# Patient Record
Sex: MALE | Race: BLACK OR AFRICAN AMERICAN | NOT HISPANIC OR LATINO | Employment: FULL TIME | URBAN - METROPOLITAN AREA
[De-identification: names, ages, dates, MRNs, and addresses within clinical notes are randomized per-mention and may not be internally consistent; named-entity substitution may affect disease eponyms.]

---

## 2022-08-27 ENCOUNTER — HOSPITAL ENCOUNTER (OUTPATIENT)
Dept: RADIOLOGY | Facility: HOSPITAL | Age: 62
Discharge: HOME | End: 2022-08-27
Payer: COMMERCIAL

## 2022-08-27 ENCOUNTER — HOSPITAL ENCOUNTER (OUTPATIENT)
Dept: MRI IMAGING | Facility: HOSPITAL | Age: 62
Discharge: HOME | End: 2022-08-27
Attending: PSYCHIATRY & NEUROLOGY
Payer: COMMERCIAL

## 2022-08-27 DIAGNOSIS — M54.17 LUMBOSACRAL RADICULOPATHY: ICD-10-CM

## 2022-08-27 PROCEDURE — 72148 MRI LUMBAR SPINE W/O DYE: CPT

## 2024-01-19 ENCOUNTER — HOSPITAL ENCOUNTER (EMERGENCY)
Facility: HOSPITAL | Age: 64
Discharge: HOME | End: 2024-01-19
Attending: EMERGENCY MEDICINE
Payer: COMMERCIAL

## 2024-01-19 VITALS
TEMPERATURE: 98.5 F | HEART RATE: 78 BPM | RESPIRATION RATE: 20 BRPM | SYSTOLIC BLOOD PRESSURE: 143 MMHG | OXYGEN SATURATION: 98 % | DIASTOLIC BLOOD PRESSURE: 92 MMHG

## 2024-01-19 DIAGNOSIS — I10 HYPERTENSION: Primary | ICD-10-CM

## 2024-01-19 DIAGNOSIS — R00.2 PALPITATIONS: ICD-10-CM

## 2024-01-19 LAB
ATRIAL RATE: 78 BPM
ATRIAL RATE: 93 BPM
P AXIS: 69 DEGREES
P AXIS: 71 DEGREES
PR INTERVAL: 152 MS
PR INTERVAL: 162 MS
QRS AXIS: 76 DEGREES
QRS AXIS: 81 DEGREES
QRSD INTERVAL: 70 MS
QRSD INTERVAL: 74 MS
QT INTERVAL: 356 MS
QT INTERVAL: 374 MS
QTC INTERVAL: 426 MS
QTC INTERVAL: 442 MS
T WAVE AXIS: 42 DEGREES
T WAVE AXIS: 42 DEGREES
VENTRICULAR RATE: 78 BPM
VENTRICULAR RATE: 93 BPM

## 2024-01-19 PROCEDURE — 93005 ELECTROCARDIOGRAM TRACING: CPT

## 2024-01-19 PROCEDURE — 99283 EMERGENCY DEPT VISIT LOW MDM: CPT

## 2024-01-19 PROCEDURE — 99284 EMERGENCY DEPT VISIT MOD MDM: CPT | Performed by: EMERGENCY MEDICINE

## 2024-01-19 NOTE — ED NOTES
Provider at bedside to update and see Pt. D/C summary expressed and provided via ER Provider.     Samson Bautista RN  01/19/24 0046

## 2024-01-19 NOTE — ED PROVIDER NOTES
"History  Chief Complaint   Patient presents with    Hypertension     Patient reports checking their blood pressure at home due to taking blood pressure medications and their meter reading \"E\". The patient reports then they began to experience the sensation of their heart \"racing\" but denies any chest pain or shortness of breath.      64 yo male with asymptomatic HTN. States that he checked his BP after he had palpitations and it was elevated. He denies cp, sob, headache, stroke sxs. He started amlodipine approximately one month ago. No other sxs or concerns at this time. Additional history from wife at bedside.         None       History reviewed. No pertinent past medical history.    History reviewed. No pertinent surgical history.    History reviewed. No pertinent family history.  I have reviewed and agree with the history as documented.    E-Cigarette/Vaping    E-Cigarette Use Never User      E-Cigarette/Vaping Substances     Social History     Tobacco Use    Smoking status: Never    Smokeless tobacco: Never   Vaping Use    Vaping status: Never Used   Substance Use Topics    Alcohol use: Yes     Comment: socially    Drug use: Never       Review of Systems   Cardiovascular:  Positive for palpitations.       Physical Exam  Physical Exam  Vitals and nursing note reviewed.   Constitutional:       General: He is not in acute distress.     Appearance: Normal appearance. He is well-developed. He is not ill-appearing, toxic-appearing or diaphoretic.   HENT:      Head: Normocephalic and atraumatic.      Mouth/Throat:      Mouth: Mucous membranes are moist.      Pharynx: Oropharynx is clear.   Eyes:      Conjunctiva/sclera: Conjunctivae normal.      Pupils: Pupils are equal, round, and reactive to light.   Neck:      Vascular: No JVD.   Cardiovascular:      Rate and Rhythm: Normal rate and regular rhythm.      Pulses: Normal pulses.      Heart sounds: Normal heart sounds. No murmur heard.     No friction rub. No gallop. "   Pulmonary:      Effort: Pulmonary effort is normal. No respiratory distress.      Breath sounds: Normal breath sounds. No stridor. No wheezing or rales.   Abdominal:      General: There is no distension.      Palpations: Abdomen is soft.      Tenderness: There is no abdominal tenderness. There is no guarding or rebound.   Musculoskeletal:         General: No swelling, tenderness, deformity or signs of injury. Normal range of motion.      Cervical back: Normal range of motion and neck supple. No rigidity.   Skin:     General: Skin is warm and dry.      Capillary Refill: Capillary refill takes less than 2 seconds.      Coloration: Skin is not jaundiced or pale.      Findings: No bruising or erythema.   Neurological:      General: No focal deficit present.      Mental Status: He is alert and oriented to person, place, and time.      Cranial Nerves: No cranial nerve deficit.      Sensory: No sensory deficit.      Motor: No weakness or abnormal muscle tone.      Coordination: Coordination normal.      Gait: Gait normal.         Vital Signs  ED Triage Vitals [01/19/24 0010]   Temperature Pulse Respirations Blood Pressure SpO2   98.5 °F (36.9 °C) 98 22 (!) 177/105 96 %      Temp Source Heart Rate Source Patient Position - Orthostatic VS BP Location FiO2 (%)   Tympanic Monitor Sitting Left arm --      Pain Score       --           Vitals:    01/19/24 0010 01/19/24 0030 01/19/24 0032   BP: (!) 177/105 143/92 143/92   Pulse: 98 78    Patient Position - Orthostatic VS: Sitting Sitting          Visual Acuity      ED Medications  Medications - No data to display    Diagnostic Studies  Results Reviewed       None                   No orders to display              Procedures  ECG 12 Lead Documentation Only    Date/Time: 1/19/2024 12:32 AM    Performed by: Samir Romero MD  Authorized by: Samir Romero MD    Indications / Diagnosis:  Palpitations  ECG reviewed by me, the ED Provider: yes    Patient location:  ED  Previous ECG:      Previous ECG:  Unavailable  Interpretation:     Interpretation: normal    Rate:     ECG rate:  93    ECG rate assessment: normal    Rhythm:     Rhythm: sinus rhythm             ED Course                               SBIRT 22yo+      Flowsheet Row Most Recent Value   Initial Alcohol Screen: US AUDIT-C     1. How often do you have a drink containing alcohol? 0 Filed at: 01/19/2024 0030   2. How many drinks containing alcohol do you have on a typical day you are drinking?  0 Filed at: 01/19/2024 0030   3a. Male UNDER 65: How often do you have five or more drinks on one occasion? 0 Filed at: 01/19/2024 0030   Audit-C Score 0 Filed at: 01/19/2024 0030   DEAN: How many times in the past year have you...    Used an illegal drug or used a prescription medication for non-medical reasons? Never Filed at: 01/19/2024 0030                      Medical Decision Making  Problems Addressed:  Hypertension: chronic illness or injury             Disposition  Final diagnoses:   Hypertension   Palpitations     Time reflects when diagnosis was documented in both MDM as applicable and the Disposition within this note       Time User Action Codes Description Comment    1/19/2024 12:35 AM RomeroSamir Add [I10] Hypertension     1/19/2024  1:18 AM Romero Samir Add [R00.2] Palpitations           ED Disposition       ED Disposition   Discharge    Condition   Stable    Date/Time   Fri Jan 19, 2024 0035    Comment   Magdy Domínguez discharge to home/self care.                   Follow-up Information       Follow up With Specialties Details Why Contact Info Additional Information    [facility] hospital Emergency Department Emergency Medicine  If symptoms worsen [address] [facility] [id number] [phone] Critical access hospital Emergency Department, [address], [place], [address]    Satnam Lama MD Family Medicine Today for further evaluation of your blood pressure [address] [facility]  [facility] [address]  [phone]               There are no discharge medications for this patient.      No discharge procedures on file.    PDMP Review       None            ED Provider  Electronically Signed by             Samir Romero MD  01/19/24 0118